# Patient Record
(demographics unavailable — no encounter records)

---

## 2025-03-28 NOTE — HISTORY OF PRESENT ILLNESS
[N] : Patient denies prior pregnancies [Currently Active] : currently active [Men] : men [Vaginal] : vaginal [Yes] : Yes [No] : No [Condoms] : Condoms [LMPDate] : 03/17/25 [FreeTextEntry1] : 03/17/25

## 2025-03-28 NOTE — PLAN
[FreeTextEntry1] : 20 y/o female presenting for annual exam: -f/u pap and GC/CT and bd affirm  -vaginal exam wnl, no bumps noted -Contraception: withdrawal method -f/u PRN